# Patient Record
Sex: FEMALE | Race: WHITE | URBAN - METROPOLITAN AREA
[De-identification: names, ages, dates, MRNs, and addresses within clinical notes are randomized per-mention and may not be internally consistent; named-entity substitution may affect disease eponyms.]

---

## 2017-01-31 ASSESSMENT — ENCOUNTER SYMPTOMS
ARTHRALGIAS: 1
STIFFNESS: 1
TINGLING: 1
HEADACHES: 0
MEMORY LOSS: 0
SEIZURES: 0
DIZZINESS: 1
DISTURBANCES IN COORDINATION: 1
NUMBNESS: 1
MUSCLE CRAMPS: 0
JOINT SWELLING: 0
NECK PAIN: 0
LOSS OF CONSCIOUSNESS: 0
TREMORS: 0
BACK PAIN: 1
POOR WOUND HEALING: 1
NAIL CHANGES: 0
WEAKNESS: 1
SKIN CHANGES: 0
MUSCLE WEAKNESS: 0
PARALYSIS: 0
MYALGIAS: 1
SPEECH CHANGE: 0

## 2017-02-01 ENCOUNTER — OFFICE VISIT (OUTPATIENT)
Dept: NEUROLOGY | Facility: CLINIC | Age: 67
End: 2017-02-01

## 2017-02-01 VITALS — SYSTOLIC BLOOD PRESSURE: 127 MMHG | DIASTOLIC BLOOD PRESSURE: 76 MMHG | HEIGHT: 63 IN | HEART RATE: 96 BPM

## 2017-02-01 DIAGNOSIS — Z98.890 HISTORY OF DECOMPRESSION OF BOTH ULNAR NERVES: ICD-10-CM

## 2017-02-01 DIAGNOSIS — G60.9 HEREDITARY AND IDIOPATHIC PERIPHERAL NEUROPATHY: Primary | ICD-10-CM

## 2017-02-01 DIAGNOSIS — Z98.890 HISTORY OF CARPAL TUNNEL RELEASE OF BOTH WRISTS: ICD-10-CM

## 2017-02-01 DIAGNOSIS — M48.07 LUMBOSACRAL STENOSIS WITH NEUROGENIC CLAUDICATION: ICD-10-CM

## 2017-02-01 RX ORDER — BUPROPION HYDROCHLORIDE 150 MG/1
150 TABLET, EXTENDED RELEASE ORAL DAILY
COMMUNITY

## 2017-02-01 ASSESSMENT — PAIN SCALES - GENERAL: PAINLEVEL: NO PAIN (0)

## 2017-02-01 NOTE — Clinical Note
2/1/2017       RE: Cristiane Ramos  [address] [facility] [id number]     Dear Colleague,    Thank you for referring your patient, Cristiane Ramos, to the [facility] NEUROLOGY at [facility]. Please see a copy of my visit note below.    IMPRESSION:  1) Idiopathic sensory neuropathy   2) Lumbosacral spine stenosis causing pseudoclaudication   3) Bilateral carpal tunnel and ulnar neuropathies that were released.      SUMMARY:    1. Her deficits related to neuropathy are stable compared to her last visit.  We will not make any changes to her pain regimen. She should be meticulous about her foot hygiene, inspect her feet daily, wear comfortable shoes and continue following the instructions from Podiatry.  She understands that she is at risk for further ulcers or Charcot joints, given her severe sensory loss to small fiber modalities.  No etiology for her neuropathy has been found after exhaustive workup including nerve biopsy. At this point there is nothing we can do beyond what the Pain Clinic is already doing for management of her symptoms. She should follow with them, who should also address the chronic back pain/lumbosacral spinal stenosis issues.     PLAN:    Follow up with Pain Management  Follow up prn in Neuropathy Clinic.    ATTENDING ADDENDUM: Patient seen and examined with MS Iain Jc who was acting as scribe on my behalf. Agree with his assessment and recs as above, which I personally reviewed and edited to reflect my own impression and findings. TT spent for patient care 15 minutes; more than half was counseling. Matthew Emery MD       HPI: This is a 66 year old patient who is seen for routine follow up of idiopathic peripheral neuropathy.  The patient denies any significant changes in terms of her neuropathy and states she fees like her symptoms have not changed since she was last seen several months ago.  She does note that she had an MI and  required stenting, but notes that with rehab and PT she feels she has returned to her baseline.  He patient continues to struggle with an ulcer on the medial arch of her left foot, but she is following up with podiatry for this and has no new complaints regarding this.  She has had some electrical/burning pain in her left lateral foot when laying down.  Secondary to this she was instructed to increase her Norco from 2x daily to 3x daily with some improvement in her pain.  She follows up with Dr. Ziegler for her chronic pain.  Otherwise the patient is doing well and denies any new concerns or complaints.        History reviewed. No pertinent past medical history.      Allergies   Allergen Reactions     Codeine Nausea and Vomiting     Oxycodone      Severe nausea and vomiting      Penicillins Hives     Percocet [Oxycodone-Acetaminophen]      Severe nausea and vomiting      Propranolol           ROS: 10 point ROS neg other than the symptoms noted above in the HPI.      FAMILY HISTORY:      Social History     Social History     Marital Status:      Spouse Name: N/A     Number of Children: N/A     Years of Education: N/A     Social History Main Topics     Smoking status: Never Smoker      Smokeless tobacco: Never Used     Alcohol Use: No     Drug Use: No     Sexual Activity: Not Asked     Other Topics Concern     None     Social History Narrative           Current outpatient prescriptions:      buPROPion (WELLBUTRIN SR) 150 MG 12 hr tablet, Take 150 mg by mouth daily, Disp: , Rfl:      Aspirin (ASPIR-81 PO), Take 1 tablet by mouth daily Chewable, Disp: , Rfl:      ATORVASTATIN CALCIUM PO, Take 80 mg by mouth At Bedtime, Disp: , Rfl:      CARVEDILOL PO, Take 3.125 mg by mouth 2 times daily (with meals), Disp: , Rfl:      CLOPIDOGREL BISULFATE PO, Take 75 mg by mouth daily, Disp: , Rfl:      nitroglycerin (NITROSTAT) 0.4 MG SL tablet, Place 0.4 mg under the tongue every 5 minutes as needed for chest pain For chest  "pain place 1 tablet under the tongue every 5 minutes for 3 doses. If symptoms persist 5 minutes after 1st dose call 911., Disp: , Rfl:      rOPINIRole (REQUIP) 0.5 MG tablet, Take 0.5 mg by mouth At Bedtime, Disp: , Rfl:      allopurinol (ZYLOPRIM) 300 MG tablet, Take 300 mg by mouth daily, Disp: , Rfl:      Magnesium 300 MG CAPS, , Disp: , Rfl:      HYDROcodone-acetaminophen (NORCO) 5-325 MG per tablet, 1 tablet by Oral or Feeding Tube route every 6 hours as needed , Disp: , Rfl:      B Complex-C-Zn-Folic Acid (DIALYVITE 800/ZINC PO), Take 1 tablet by mouth daily, Disp: , Rfl:      ammonium lactate (AMLACTIN) 12 % cream, Apply topically daily as needed for dry skin, Disp: , Rfl:      Omega-3 Fatty Acids (OMEGA-3 FISH OIL PO), Take 1 g by mouth 2 times daily (with meals), Disp: , Rfl:      TRAZODONE HCL PO, Take 50 mg by mouth At Bedtime, Disp: , Rfl:      traMADol (ULTRAM) 50 MG tablet, Take 50 mg by mouth every 6 hours as needed for moderate pain , Disp: 20 tablet, Rfl: 0     lidocaine (LIDODERM) 5 % patch, 1-3 patches for 12 hours, then remove for 12 hours., Disp: 90 patch, Rfl: 11     CALCITRIOL PO, Take 0.25 mcg by mouth daily, Disp: , Rfl:      GABAPENTIN PO, Take 600 mg by mouth 300mg in AM and lunch, and take 600mg at bedtime, Disp: , Rfl:      LAMOTRIGINE PO, Take 100 mg by mouth daily , Disp: , Rfl:      LOSARTAN POTASSIUM PO, Take 50 mg by mouth daily , Disp: , Rfl:      Acetaminophen (TYLENOL PO), Take 325 mg by mouth every 8 hours as needed for mild pain or fever, Disp: , Rfl:      diclofenac (VOLTAREN) 1 % GEL, Place onto the skin daily as needed for moderate pain, Disp: , Rfl:         PHYSICAL EXAMINATION:    Vital signs:   @FLOWREFRESH([id number]::), )Blood pressure 127/76, pulse 96, height 1.6 m (5' 3\"), not currently breastfeeding.  Estimated body mass index is 23.73 kg/(m^2) as calculated from the following:    Height as of this encounter: 1.6 m (5' 3\").    Weight as of 10/28/16: 60.737 kg (133 lb " 14.4 oz).      General: Well appearing female, sitting upright using iPad.    NEURO:    Mental status:  AAOx3. Good attention span.   Normal language fluency.  CN: II-XII intact.    Motor:  5/5 strengthfor bilateral shoulder abduction, elbow flexion and extension, finger and wrist extension.  FDIs are bilaterally 4.  APBs are probably 5.  Flexor pollicis longus and long finger flexors to digits #2 and #3 are intact on both sides.  FDP to digits #4 and #5 is strong (5/5) on the right and left.  Hip flexion, knee extension are bilaterally 5.  Knee flexion is right 4+, left 4+, and foot dorsiflexion is about a 4+-5/5 bilaterally.  She cannot spread her toes.  She has an ulcer that is healing on the medial arch of the left foot and joint deformities consistent with Charcot joints.      Sensory:  No reflexes at the knees or ankles, 2 in the biceps, 1+ right triceps, trace left and 2 at brachioradialis.  Plantar responses are bilaterally flexor.  No vibration at the toes, but she has fairly preserved vibration for about 8-9 seconds at the right medial malleolus, 4 at the left, and 8-9 seconds at both kneecaps.  Vibration sensation is preserved for 15 seconds at the right index finger.  Joint position sense is impaired at the toes.  This does not seem fundamentally changed from her previous assessment.      Gait: Patient now uses walker with ambulation to help with balance issues.  She has used this since her MI in 10/2016 and denies any recent changes.    Answers for HPI/ROS submitted by the patient on 1/31/2017   General Symptoms: No  Skin Symptoms: Yes  HENT Symptoms: No  EYE SYMPTOMS: No  HEART SYMPTOMS: No  LUNG SYMPTOMS: No  INTESTINAL SYMPTOMS: No  URINARY SYMPTOMS: No  GYNECOLOGIC SYMPTOMS: No  BREAST SYMPTOMS: No  SKELETAL SYMPTOMS: Yes  BLOOD SYMPTOMS: No  NERVOUS SYSTEM SYMPTOMS: Yes  MENTAL HEALTH SYMPTOMS: No  Changes in hair: Yes  Changes in moles/birth marks: No  Itching: No  Rashes: No  Changes in nails:  No  Acne: No  Hair in places you don't want it: No  Change in facial hair: No  Warts: No  Non-healing sores: Yes  Scarring: No  Flaking of skin: No  Color changes of hands/feet in cold : No  Sun sensitivity: No  Skin thickening: No  Back pain: Yes  Muscle aches: Yes  Neck pain: No  Swollen joints: No  Joint pain: Yes  Bone pain: No  Muscle cramps: No  Muscle weakness: No  Joint stiffness: Yes  Bone fracture: No  Trouble with coordination: Yes  Dizziness or trouble with balance: Yes  Fainting or black-out spells: No  Memory loss: No  Headache: No  Seizures: No  Speech problems: No  Tingling: Yes  Tremor: No  Weakness: Yes  Difficulty walking: No  Paralysis: No  Numbness: Yes    Matthew Emery MD

## 2017-02-01 NOTE — PROGRESS NOTES
IMPRESSION:  1) Idiopathic sensory neuropathy   2) Lumbosacral spine stenosis causing pseudoclaudication   3) Bilateral carpal tunnel and ulnar neuropathies that were released.      SUMMARY:    1. Her deficits related to neuropathy are stable compared to her last visit.  We will not make any changes to her pain regimen. She should be meticulous about her foot hygiene, inspect her feet daily, wear comfortable shoes and continue following the instructions from Podiatry.  She understands that she is at risk for further ulcers or Charcot joints, given her severe sensory loss to small fiber modalities.  No etiology for her neuropathy has been found after exhaustive workup including nerve biopsy. At this point there is nothing we can do beyond what the Pain Clinic is already doing for management of her symptoms. She should follow with them, who should also address the chronic back pain/lumbosacral spinal stenosis issues.     PLAN:    Follow up with Pain Management  Follow up prn in Neuropathy Clinic.    ATTENDING ADDENDUM: Patient seen and examined with MS Iain Jc who was acting as scribe on my behalf. Agree with his assessment and recs as above, which I personally reviewed and edited to reflect my own impression and findings. TT spent for patient care 15 minutes; more than half was counseling. Matthew Emery MD       HPI: This is a 66 year old patient who is seen for routine follow up of idiopathic peripheral neuropathy.  The patient denies any significant changes in terms of her neuropathy and states she fees like her symptoms have not changed since she was last seen several months ago.  She does note that she had an MI and required stenting, but notes that with rehab and PT she feels she has returned to her baseline.  He patient continues to struggle with an ulcer on the medial arch of her left foot, but she is following up with podiatry for this and has no new complaints regarding this.  She has had some  electrical/burning pain in her left lateral foot when laying down.  Secondary to this she was instructed to increase her Norco from 2x daily to 3x daily with some improvement in her pain.  She follows up with Dr. Ziegler for her chronic pain.  Otherwise the patient is doing well and denies any new concerns or complaints.        History reviewed. No pertinent past medical history.      Allergies   Allergen Reactions     Codeine Nausea and Vomiting     Oxycodone      Severe nausea and vomiting      Penicillins Hives     Percocet [Oxycodone-Acetaminophen]      Severe nausea and vomiting      Propranolol           ROS: 10 point ROS neg other than the symptoms noted above in the HPI.      FAMILY HISTORY:      Social History     Social History     Marital Status:      Spouse Name: N/A     Number of Children: N/A     Years of Education: N/A     Social History Main Topics     Smoking status: Never Smoker      Smokeless tobacco: Never Used     Alcohol Use: No     Drug Use: No     Sexual Activity: Not Asked     Other Topics Concern     None     Social History Narrative           Current outpatient prescriptions:      buPROPion (WELLBUTRIN SR) 150 MG 12 hr tablet, Take 150 mg by mouth daily, Disp: , Rfl:      Aspirin (ASPIR-81 PO), Take 1 tablet by mouth daily Chewable, Disp: , Rfl:      ATORVASTATIN CALCIUM PO, Take 80 mg by mouth At Bedtime, Disp: , Rfl:      CARVEDILOL PO, Take 3.125 mg by mouth 2 times daily (with meals), Disp: , Rfl:      CLOPIDOGREL BISULFATE PO, Take 75 mg by mouth daily, Disp: , Rfl:      nitroglycerin (NITROSTAT) 0.4 MG SL tablet, Place 0.4 mg under the tongue every 5 minutes as needed for chest pain For chest pain place 1 tablet under the tongue every 5 minutes for 3 doses. If symptoms persist 5 minutes after 1st dose call 911., Disp: , Rfl:      rOPINIRole (REQUIP) 0.5 MG tablet, Take 0.5 mg by mouth At Bedtime, Disp: , Rfl:      allopurinol (ZYLOPRIM) 300 MG tablet, Take 300 mg by mouth  "daily, Disp: , Rfl:      Magnesium 300 MG CAPS, , Disp: , Rfl:      HYDROcodone-acetaminophen (NORCO) 5-325 MG per tablet, 1 tablet by Oral or Feeding Tube route every 6 hours as needed , Disp: , Rfl:      B Complex-C-Zn-Folic Acid (DIALYVITE 800/ZINC PO), Take 1 tablet by mouth daily, Disp: , Rfl:      ammonium lactate (AMLACTIN) 12 % cream, Apply topically daily as needed for dry skin, Disp: , Rfl:      Omega-3 Fatty Acids (OMEGA-3 FISH OIL PO), Take 1 g by mouth 2 times daily (with meals), Disp: , Rfl:      TRAZODONE HCL PO, Take 50 mg by mouth At Bedtime, Disp: , Rfl:      traMADol (ULTRAM) 50 MG tablet, Take 50 mg by mouth every 6 hours as needed for moderate pain , Disp: 20 tablet, Rfl: 0     lidocaine (LIDODERM) 5 % patch, 1-3 patches for 12 hours, then remove for 12 hours., Disp: 90 patch, Rfl: 11     CALCITRIOL PO, Take 0.25 mcg by mouth daily, Disp: , Rfl:      GABAPENTIN PO, Take 600 mg by mouth 300mg in AM and lunch, and take 600mg at bedtime, Disp: , Rfl:      LAMOTRIGINE PO, Take 100 mg by mouth daily , Disp: , Rfl:      LOSARTAN POTASSIUM PO, Take 50 mg by mouth daily , Disp: , Rfl:      Acetaminophen (TYLENOL PO), Take 325 mg by mouth every 8 hours as needed for mild pain or fever, Disp: , Rfl:      diclofenac (VOLTAREN) 1 % GEL, Place onto the skin daily as needed for moderate pain, Disp: , Rfl:         PHYSICAL EXAMINATION:    Vital signs:   @FLOWREFRESH([id number]::), )Blood pressure 127/76, pulse 96, height 1.6 m (5' 3\"), not currently breastfeeding.  Estimated body mass index is 23.73 kg/(m^2) as calculated from the following:    Height as of this encounter: 1.6 m (5' 3\").    Weight as of 10/28/16: 60.737 kg (133 lb 14.4 oz).      General: Well appearing female, sitting upright using iPad.    NEURO:    Mental status:  AAOx3. Good attention span.   Normal language fluency.  CN: II-XII intact.    Motor:  5/5 strengthfor bilateral shoulder abduction, elbow flexion and extension, finger and wrist " extension.  FDIs are bilaterally 4.  APBs are probably 5.  Flexor pollicis longus and long finger flexors to digits #2 and #3 are intact on both sides.  FDP to digits #4 and #5 is strong (5/5) on the right and left.  Hip flexion, knee extension are bilaterally 5.  Knee flexion is right 4+, left 4+, and foot dorsiflexion is about a 4+-5/5 bilaterally.  She cannot spread her toes.  She has an ulcer that is healing on the medial arch of the left foot and joint deformities consistent with Charcot joints.      Sensory:  No reflexes at the knees or ankles, 2 in the biceps, 1+ right triceps, trace left and 2 at brachioradialis.  Plantar responses are bilaterally flexor.  No vibration at the toes, but she has fairly preserved vibration for about 8-9 seconds at the right medial malleolus, 4 at the left, and 8-9 seconds at both kneecaps.  Vibration sensation is preserved for 15 seconds at the right index finger.  Joint position sense is impaired at the toes.  This does not seem fundamentally changed from her previous assessment.      Gait: Patient now uses walker with ambulation to help with balance issues.  She has used this since her MI in 10/2016 and denies any recent changes.    Answers for HPI/ROS submitted by the patient on 1/31/2017   General Symptoms: No  Skin Symptoms: Yes  HENT Symptoms: No  EYE SYMPTOMS: No  HEART SYMPTOMS: No  LUNG SYMPTOMS: No  INTESTINAL SYMPTOMS: No  URINARY SYMPTOMS: No  GYNECOLOGIC SYMPTOMS: No  BREAST SYMPTOMS: No  SKELETAL SYMPTOMS: Yes  BLOOD SYMPTOMS: No  NERVOUS SYSTEM SYMPTOMS: Yes  MENTAL HEALTH SYMPTOMS: No  Changes in hair: Yes  Changes in moles/birth marks: No  Itching: No  Rashes: No  Changes in nails: No  Acne: No  Hair in places you don't want it: No  Change in facial hair: No  Warts: No  Non-healing sores: Yes  Scarring: No  Flaking of skin: No  Color changes of hands/feet in cold : No  Sun sensitivity: No  Skin thickening: No  Back pain: Yes  Muscle aches: Yes  Neck pain:  No  Swollen joints: No  Joint pain: Yes  Bone pain: No  Muscle cramps: No  Muscle weakness: No  Joint stiffness: Yes  Bone fracture: No  Trouble with coordination: Yes  Dizziness or trouble with balance: Yes  Fainting or black-out spells: No  Memory loss: No  Headache: No  Seizures: No  Speech problems: No  Tingling: Yes  Tremor: No  Weakness: Yes  Difficulty walking: No  Paralysis: No  Numbness: Yes

## 2017-04-17 ENCOUNTER — OFFICE VISIT (OUTPATIENT)
Dept: ANESTHESIOLOGY | Facility: CLINIC | Age: 67
End: 2017-04-17

## 2017-04-17 VITALS
WEIGHT: 135.1 LBS | BODY MASS INDEX: 23.93 KG/M2 | HEART RATE: 83 BPM | OXYGEN SATURATION: 100 % | SYSTOLIC BLOOD PRESSURE: 114 MMHG | DIASTOLIC BLOOD PRESSURE: 71 MMHG

## 2017-04-17 DIAGNOSIS — M47.816 SPONDYLOSIS OF LUMBAR REGION WITHOUT MYELOPATHY OR RADICULOPATHY: ICD-10-CM

## 2017-04-17 DIAGNOSIS — G60.9 HEREDITARY AND IDIOPATHIC PERIPHERAL NEUROPATHY: Primary | ICD-10-CM

## 2017-04-17 DIAGNOSIS — M53.3 SACROILIAC JOINT PAIN: ICD-10-CM

## 2017-04-17 DIAGNOSIS — M47.817 LUMBOSACRAL SPONDYLOSIS WITHOUT MYELOPATHY: ICD-10-CM

## 2017-04-17 RX ORDER — TOPIRAMATE 25 MG/1
25 TABLET, FILM COATED ORAL 2 TIMES DAILY
Qty: 60 TABLET | Refills: 3 | Status: SHIPPED | OUTPATIENT
Start: 2017-04-17

## 2017-04-17 NOTE — PATIENT INSTRUCTIONS
1. Start taking topamax as follows:    1 tab= 25mg  AM   PM  0   25mg (1 tab).  If tolerating, after 1 week go to the next line.  25mg (1 tab)  25mg (1 tab).  Goal    -This medicine may make you dizzy, drowsy, or tired. Do not drive or do anything else that could be dangerous until you know how this medicine affects you.  -Remain well hydrated, due to risk of kidney stones  -Do not stop using this medicine suddenly. Your doctor will need to slowly decrease your dose before you stop it completely.    2. Restart taking alpha lipoic acid      Follow up:    With Dr. Gordon at the  as needed or before June 2nd    To speak with a nurse, schedule/reschedule/cancel a clinic appointment, or request a medication refill call: [phone]     You can also reach us by [employer]: [email or web address]    For refills, please call on Monday, 1 week before your medication runs out. The doctors are not always in clinic, so this gives us time to get your prescriptions ready.  Please let us know the name of the medication you are requesting a refill of.

## 2017-04-17 NOTE — MR AVS SNAPSHOT
After Visit Summary   4/17/2017    Cristiane Ramos    MRN: [id number]           Patient Information     Date Of Birth          1950        Visit Information        Provider Department      4/17/2017 10:30 AM Victor Manuel Gordon MD [facility]        Today's Diagnoses     Hereditary and idiopathic peripheral neuropathy    -  1    Lumbosacral spondylosis without myelopathy        Spondylosis of lumbar region without myelopathy or radiculopathy        Sacroiliac joint pain          Care Instructions    1. Start taking topamax as follows:    1 tab= 25mg  AM   PM  0   25mg (1 tab).  If tolerating, after 1 week go to the next line.  25mg (1 tab)  25mg (1 tab).  Goal    -This medicine may make you dizzy, drowsy, or tired. Do not drive or do anything else that could be dangerous until you know how this medicine affects you.  -Remain well hydrated, due to risk of kidney stones  -Do not stop using this medicine suddenly. Your doctor will need to slowly decrease your dose before you stop it completely.    2. Restart taking alpha lipoic acid      Follow up:    With Dr. Gordon at the  as needed or before June 2nd    To speak with a nurse, schedule/reschedule/cancel a clinic appointment, or request a medication refill call: [phone]     You can also reach us by [employer]: [email or web address]    For refills, please call on Monday, 1 week before your medication runs out. The doctors are not always in clinic, so this gives us time to get your prescriptions ready.  Please let us know the name of the medication you are requesting a refill of.                                   Follow-ups after your visit        Who to contact     Please call your clinic at [phone] to:    Ask questions about your health    Make or cancel appointments    Discuss your medicines    Learn about your test results    Speak to your doctor   If you have compliments or  concerns about an experience at your clinic, or if you wish to file a complaint, please contact [facility] Patient Relations at [phone] or email us at [email or web address]         Additional Information About Your Visit        [employer] Information     [employer] gives you secure access to your electronic health record. If you see a primary care provider, you can also send messages to your care team and make appointments. If you have questions, please call your primary care clinic.  If you do not have a primary care provider, please call [phone] and they will assist you.      [employer] is an electronic gateway that provides easy, online access to your medical records. With [employer], you can request a clinic appointment, read your test results, renew a prescription or communicate with your care team.     To access your existing account, please contact your [facility] Clinic or call [phone] for assistance.        Care EveryWhere ID     This is your Care EveryWhere ID. This could be used by other organizations to access your [facility] medical records  [id number]        Your Vitals Were     Pulse Pulse Oximetry BMI (Body Mass Index)             83 100% 23.93 kg/m2          Blood Pressure from Last 3 Encounters:   04/17/17 114/71   02/01/17 127/76   10/28/16 129/70    Weight from Last 3 Encounters:   04/17/17 61.3 kg (135 lb 1.6 oz)   10/28/16 60.7 kg (133 lb 14.4 oz)   08/31/16 60.3 kg (133 lb)              Today, you had the following     No orders found for display         Today's Medication Changes          These changes are accurate as of: 4/17/17 10:35 AM.  If you have any questions, ask your nurse or doctor.               Start taking these medicines.        Dose/Directions    topiramate 25 MG tablet   Commonly known as:  TOPAMAX   Used for:  Hereditary and idiopathic peripheral neuropathy   Started by:  Victor Manuel Gordon MD        Dose:  25 mg    Take 1 tablet (25 mg) by mouth 2 times daily Take as directed in clinic   Quantity:  60 tablet   Refills:  3            Where to get your medicines      These medications were sent to [id number] - [place], [place] - [address] AT [facility] & [facility] [address]  [address], [place] [address]     Phone:  [phone]     topiramate 25 MG tablet                Primary Care Provider Office Phone # Fax #    Gómez Walters [phone]       [facility] [address] [facility] [address]        Thank you!     Thank you for choosing [facility] [[email or web address]] FOR COMPREHENSIVE PAIN MANAGEMENT  for your care. Our goal is always to provide you with excellent care. Hearing back from our patients is one way we can continue to improve our services. Please take a few minutes to complete the written survey that you may receive in the mail after your visit with us. Thank you!             Your Updated Medication List - Protect others around you: Learn how to safely use, store and throw away your medicines at www.disposemymeds.org.          This list is accurate as of: 4/17/17 10:35 AM.  Always use your most recent med list.                   Brand Name Dispense Instructions for use    allopurinol 300 MG tablet    ZYLOPRIM     Take 300 mg by mouth daily       ammonium lactate 12 % cream    AMLACTIN     Apply topically daily as needed for dry skin       ATORVASTATIN CALCIUM PO      Take 80 mg by mouth At Bedtime       buPROPion 150 MG 12 hr tablet    WELLBUTRIN SR     Take 150 mg by mouth daily       CALCITRIOL PO      Take 0.25 mcg by mouth daily       CARVEDILOL PO      Take 6.25 mg by mouth 2 times daily (with meals)       CLOPIDOGREL BISULFATE PO      Take 75 mg by mouth daily       DIALYVITE 800/ZINC PO      Take 1 tablet by mouth daily       diclofenac 1 % Gel topical gel    VOLTAREN     Place onto the skin daily as needed for moderate pain       GABAPENTIN PO      Take  600 mg by mouth 300mg in AM and lunch, and take 600mg at bedtime       HYDROcodone-acetaminophen 5-325 MG per tablet    NORCO     1 tablet by Oral or Feeding Tube route every 6 hours as needed       LAMOTRIGINE PO      Take 100 mg by mouth daily       lidocaine 5 % Patch    LIDODERM    90 patch    1-3 patches for 12 hours, then remove for 12 hours.       LOSARTAN POTASSIUM PO      Take 50 mg by mouth daily       Magnesium 300 MG Caps          nitroglycerin 0.4 MG sublingual tablet    NITROSTAT     Place 0.4 mg under the tongue every 5 minutes as needed for chest pain For chest pain place 1 tablet under the tongue every 5 minutes for 3 doses. If symptoms persist 5 minutes after 1st dose call 911.       OMEGA-3 FISH OIL PO      Take 1 g by mouth 2 times daily (with meals)       REQUIP 0.5 MG tablet   Generic drug:  rOPINIRole      Take 0.5 mg by mouth At Bedtime       topiramate 25 MG tablet    TOPAMAX    60 tablet    Take 1 tablet (25 mg) by mouth 2 times daily Take as directed in clinic       traMADol 50 MG tablet    ULTRAM    20 tablet    Take 50 mg by mouth every 6 hours as needed for moderate pain       TRAZODONE HCL PO      Take 50 mg by mouth At Bedtime       TYLENOL PO      Take 325 mg by mouth every 8 hours as needed for mild pain or fever

## 2017-04-17 NOTE — LETTER
4/17/2017       RE: Cristiane Ramos  [address] [facility] [address] [facility] [address]     Dear Colleague,    Thank you for referring your patient, Cristiane Ramos, to the [facility] FOR [facility]ENSIVE PAIN MANAGEMENT at [facility]. Please see a copy of my visit note below.    Pain Clinic Follow-up Visit  4/17/2017    Interim history:    Has been doing fairly well from cardiac standpoint. Now doing maintenance cardiac rehabilitation . Has only used nitro about once per month. Still planning to be on plavix for a full year from her myocardial infarction (on 10/4/16). Is hoping to come off of coumadin soon, and follow-up with cardiology is soon.     Patient still with considerable low back pain. This is relatively unchanged from previous. Standing and walking is limited to 15 minutes or less. Uses a walker when walking a dog. Has been wearing back brace which helps. Medial branch blocks helped a lot with this pain for a transient time period.     Bilateral knee pain as well. Has been seen at [facility] for this issue.     Also peripheral neuropathy with significant pain.     gabapentin 300-300-600, with benefit but kidney disease limits higher dosing. Tramadol 100mg usually BID with benefit. lidoderm patches on the feet. Vicodin use is average of about 1 every day since the myocardial infarction. She is worried about this daily use and would like to use less. Alpha lipoic acid was somewhat benefit for the neuropathic pain. She ran out of it but plans to get more.     No history of renal stones.       Original Subjective:    65 year old female with past medical history of idiopathic peripheral neuropathy, stage IV kidney failure, right charcot foot, presents for evaluation of back pain and bilateral lower extremities pain. The neuropathy affects bilateral upper extremities and lower extremities in mostly a stocking/glove pattern. She has had surgeries on both carpal tunnels  and to release both ulnar nerves as well. Patient with the significant low back pain since 2012, less bothersome for many years before that. No inciting event or trauma. Bilateral lower extremities numbness started about in 2008. Ambulation is now limited to about a half a block. Patient states that this walking is limited by pain in the back getting progressively worse, and not by lower extremities pain or weakness. Pain is the worst in the back.   The back pain is achy, sharp with movement. The pain is fairly constant. Pain can be severe at times, but waxes and wanes in severity. Pain on average is about 5/10. Pain is better with rest, medications. Worse with prolonged activity. The patient endorses left lower extremity weakness, which reportedly does not affect walking, but she states is still there. Feet essentially insensate up to mid shin. Hands with altered/ diminished sensation throughout. No incontinence of bowel or bladder.  Taking aleve, with benefit. gabapentin 300-300-600, with benefit but kidney disease limits higher dosing. Tramadol 100mg BID with benefit. lidoderm patches on the feet and the low back. Vicodin use is about 1 every other day, maybe 17 tabs per month.   physical therapy has not been done for her back in about 3 years. She does do home exercise/stretching program, but not on a regular basis.   Acupuncture was no help. Transforaminal epidural steroid injection on either L4 or L5 on the left per Dr Kidd at [facility] gave transient relief but no lasting effect. Dr Kidd has reportedly recommended a multilevel fusion for treatment of her back pain.    ?  past medical history  Idiopathic neuropathy  Low back pain   past medical history reviewed with patient.   Stage IV kidney disease     past surgical history:  surgeries on both carpal tunnels and to release both ulnar nerves as well.    past surgical history reviewed with patient.     Medications:  Current Outpatient Prescriptions    Medication     buPROPion (WELLBUTRIN SR) 150 MG 12 hr tablet     Aspirin (ASPIR-81 PO)     ATORVASTATIN CALCIUM PO     CARVEDILOL PO     CLOPIDOGREL BISULFATE PO     nitroglycerin (NITROSTAT) 0.4 MG SL tablet     rOPINIRole (REQUIP) 0.5 MG tablet     allopurinol (ZYLOPRIM) 300 MG tablet     Magnesium 300 MG CAPS     HYDROcodone-acetaminophen (NORCO) 5-325 MG per tablet     B Complex-C-Zn-Folic Acid (DIALYVITE 800/ZINC PO)     ammonium lactate (AMLACTIN) 12 % cream     Omega-3 Fatty Acids (OMEGA-3 FISH OIL PO)     TRAZODONE HCL PO     traMADol (ULTRAM) 50 MG tablet     lidocaine (LIDODERM) 5 % patch     CALCITRIOL PO     GABAPENTIN PO     LAMOTRIGINE PO     LOSARTAN POTASSIUM PO     Acetaminophen (TYLENOL PO)     diclofenac (VOLTAREN) 1 % GEL     No current facility-administered medications for this visit.      [place] and [place] Prescription Monitoring Program reviewed    Allergies:     Allergies   Allergen Reactions     Codeine Nausea and Vomiting     Oxycodone      Severe nausea and vomiting      Penicillins Hives     Percocet [Oxycodone-Acetaminophen]      Severe nausea and vomiting      Propranolol      Family History:  family history is not on file.  Social history: She lives in [facility]. 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 No street drugs.     Review Of Systems  Skin: ulcers on feet  Eyes: glasses  Ears/Nose/Throat: negative  Respiratory: No shortness of breath, dyspnea on exertion, cough, or hemoptysis  Cardiovascular: negative  Gastrointestinal: negative  Genitourinary: as above, kidney failure  Musculoskeletal: as above and back pain  Neurologic: as above, numbness or tingling of hands and numbness or tingling of feet  Psychiatric: negative  Hematologic/Lymphatic/Immunologic: negative  Endocrine: negative    Objective:   /71  Pulse 83  Wt 61.3 kg (135 lb 1.6 oz)  SpO2 100%  BMI 23.93 kg/m2  Body mass index is 23.93 kg/(m^2).  General: In no apparent  distress  Mental status: Normal affect, pleasant  Head: Atraumatic, normocephalic  Eyes: Extra-ocular movements intact, no scleral icterus  Cardiovascular: Regular rate,   Respiratory: No respiratory distress  Abdomen: soft, non-distended  Msk: Pain with extension and rotation of lumbar spine. Tender to palpation lower lumbar paraspinals.  Neuro: AAOx3.   CN II-XII are grossly intact.   At least antigravity strength noted in all 4 extremities  Skin: No rashes or lesions noted on exposed areas of skin  Lymph: no supraclavicular lymphadenopathy    Imaging: MRI 8/6/14 from [facility] Rad[facility] results scanned into the system, summarized as moderately severe central stenosis L3/4 and severe facet arthropathy at L3/4, L4/5 and L5/S1.    DIRE Score for ongoing opioid management is calculated as follows:    Diagnosis = 2    Intractability = 2    Risk: Psych = 2  Chem Hlth = 3  Reliability = 3  Social = 3    Efficacy = 3    Total DIRE Score = 18 (14 or higher predicts good candidate for ongoing opioid management; 13 or lower predicts poor candidate for opioid management)         Assessment:  1. Lumbar stenosis with neurogenic claudication: patient does not think that the lower extremity symptoms are what limits her ability to ambulate significant distance, but this is somewhat clouded by the severity of her peripheral neuropathy.   2. Lumbar spondylosis: this still appears be the major source of her pain and dysfunction  3. Idiopathic polyneuropathy: this is severe and clouds the above diagnosis   4. Sacroiliac joint pain: on exam this appears to be a secondary pain generator. This will be better delineated through diagnostic injections.     Barriers:  1. none    Plan:   1. Patient education: I went over the above diagnoses and treatment plan with her and answered all of her questions.  2. Exercise program: continue cardiac rehab  3. Medications: Add low dose topamax considering renal function. restart alpha lipoic  acid  4. Interventions: lumbar radiofrequency ablation on hold due to the need for plavix, coumadin  5. Referrals: none  6. Follow up: prn      Total face to face time spent was 20 minutes, and more than 50% of face to face time was spent in counseling and/or coordination of care regarding principles of multidisciplinary care, medication management, and interventional management.     Victor Manuel Gordon MD  Pain Medicine  Physical Medicine and Rehabilitation  [facility] Department of Anesthesia

## 2017-04-17 NOTE — PROGRESS NOTES
Pain Clinic Follow-up Visit  4/17/2017    Interim history:    Has been doing fairly well from cardiac standpoint. Now doing maintenance cardiac rehabilitation . Has only used nitro about once per month. Still planning to be on plavix for a full year from her myocardial infarction (on 10/4/16). Is hoping to come off of coumadin soon, and follow-up with cardiology is soon.     Patient still with considerable low back pain. This is relatively unchanged from previous. Standing and walking is limited to 15 minutes or less. Uses a walker when walking a dog. Has been wearing back brace which helps. Medial branch blocks helped a lot with this pain for a transient time period.     Bilateral knee pain as well. Has been seen at [facility] for this issue.     Also peripheral neuropathy with significant pain.     gabapentin 300-300-600, with benefit but kidney disease limits higher dosing. Tramadol 100mg usually BID with benefit. lidoderm patches on the feet. Vicodin use is average of about 1 every day since the myocardial infarction. She is worried about this daily use and would like to use less. Alpha lipoic acid was somewhat benefit for the neuropathic pain. She ran out of it but plans to get more.     No history of renal stones.       Original Subjective:    65 year old female with past medical history of idiopathic peripheral neuropathy, stage IV kidney failure, right charcot foot, presents for evaluation of back pain and bilateral lower extremities pain. The neuropathy affects bilateral upper extremities and lower extremities in mostly a stocking/glove pattern. She has had surgeries on both carpal tunnels and to release both ulnar nerves as well. Patient with the significant low back pain since 2012, less bothersome for many years before that. No inciting event or trauma. Bilateral lower extremities numbness started about in 2008. Ambulation is now limited to about a half a block. Patient states that this walking is  limited by pain in the back getting progressively worse, and not by lower extremities pain or weakness. Pain is the worst in the back.   The back pain is achy, sharp with movement. The pain is fairly constant. Pain can be severe at times, but waxes and wanes in severity. Pain on average is about 5/10. Pain is better with rest, medications. Worse with prolonged activity. The patient endorses left lower extremity weakness, which reportedly does not affect walking, but she states is still there. Feet essentially insensate up to mid shin. Hands with altered/ diminished sensation throughout. No incontinence of bowel or bladder.  Taking aleve, with benefit. gabapentin 300-300-600, with benefit but kidney disease limits higher dosing. Tramadol 100mg BID with benefit. lidoderm patches on the feet and the low back. Vicodin use is about 1 every other day, maybe 17 tabs per month.   physical therapy has not been done for her back in about 3 years. She does do home exercise/stretching program, but not on a regular basis.   Acupuncture was no help. Transforaminal epidural steroid injection on either L4 or L5 on the left per Dr Kidd at [facility] gave transient relief but no lasting effect. Dr Kidd has reportedly recommended a multilevel fusion for treatment of her back pain.    ?  past medical history  Idiopathic neuropathy  Low back pain   past medical history reviewed with patient.   Stage IV kidney disease     past surgical history:  surgeries on both carpal tunnels and to release both ulnar nerves as well.    past surgical history reviewed with patient.     Medications:  Current Outpatient Prescriptions   Medication     buPROPion (WELLBUTRIN SR) 150 MG 12 hr tablet     Aspirin (ASPIR-81 PO)     ATORVASTATIN CALCIUM PO     CARVEDILOL PO     CLOPIDOGREL BISULFATE PO     nitroglycerin (NITROSTAT) 0.4 MG SL tablet     rOPINIRole (REQUIP) 0.5 MG tablet     allopurinol (ZYLOPRIM) 300 MG tablet     Magnesium 300 MG CAPS      HYDROcodone-acetaminophen (NORCO) 5-325 MG per tablet     B Complex-C-Zn-Folic Acid (DIALYVITE 800/ZINC PO)     ammonium lactate (AMLACTIN) 12 % cream     Omega-3 Fatty Acids (OMEGA-3 FISH OIL PO)     TRAZODONE HCL PO     traMADol (ULTRAM) 50 MG tablet     lidocaine (LIDODERM) 5 % patch     CALCITRIOL PO     GABAPENTIN PO     LAMOTRIGINE PO     LOSARTAN POTASSIUM PO     Acetaminophen (TYLENOL PO)     diclofenac (VOLTAREN) 1 % GEL     No current facility-administered medications for this visit.      [place] Pres[place] Monitoring [place] reviewed    Allergies:     Allergies   Allergen Reactions     Codeine Nausea and Vomiting     Oxycodone      Severe nausea and vomiting      Penicillins Hives     Percocet [Oxycodone-Acetaminophen]      Severe nausea and vomiting      Propranolol      Family History:  family history is not on file.  Social history: She lives in [facility]. 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 No street drugs.     Review Of Systems  Skin: ulcers on feet  Eyes: glasses  Ears/Nose/Throat: negative  Respiratory: No shortness of breath, dyspnea on exertion, cough, or hemoptysis  Cardiovascular: negative  Gastrointestinal: negative  Genitourinary: as above, kidney failure  Musculoskeletal: as above and back pain  Neurologic: as above, numbness or tingling of hands and numbness or tingling of feet  Psychiatric: negative  Hematologic/Lymphatic/Immunologic: negative  Endocrine: negative    Objective:   /71  Pulse 83  Wt 61.3 kg (135 lb 1.6 oz)  SpO2 100%  BMI 23.93 kg/m2  Body mass index is 23.93 kg/(m^2).  General: In no apparent distress  Mental status: Normal affect, pleasant  Head: Atraumatic, normocephalic  Eyes: Extra-ocular movements intact, no scleral icterus  Cardiovascular: Regular rate,   Respiratory: No respiratory distress  Abdomen: soft, non-distended  Msk: Pain with extension and rotation of lumbar spine. Tender to palpation lower  lumbar paraspinals.  Neuro: AAOx3.   CN II-XII are grossly intact.   At least antigravity strength noted in all 4 extremities  Skin: No rashes or lesions noted on exposed areas of skin  Lymph: no supraclavicular lymphadenopathy    Imaging: MRI 8/6/14 from [facility] Radiology results scanned into the system, summarized as moderately severe central stenosis L3/4 and severe facet arthropathy at L3/4, L4/5 and L5/S1.    DIRE Score for ongoing opioid management is calculated as follows:    Diagnosis = 2    Intractability = 2    Risk: Psych = 2  Chem Hlth = 3  Reliability = 3  Social = 3    Efficacy = 3    Total DIRE Score = 18 (14 or higher predicts good candidate for ongoing opioid management; 13 or lower predicts poor candidate for opioid management)         Assessment:  1. Lumbar stenosis with neurogenic claudication: patient does not think that the lower extremity symptoms are what limits her ability to ambulate significant distance, but this is somewhat clouded by the severity of her peripheral neuropathy.   2. Lumbar spondylosis: this still appears be the major source of her pain and dysfunction  3. Idiopathic polyneuropathy: this is severe and clouds the above diagnosis   4. Sacroiliac joint pain: on exam this appears to be a secondary pain generator. This will be better delineated through diagnostic injections.     Barriers:  1. none    Plan:   1. Patient education: I went over the above diagnoses and treatment plan with her and answered all of her questions.  2. Exercise program: continue cardiac rehab  3. Medications: Add low dose topamax considering renal function. restart alpha lipoic acid  4. Interventions: lumbar radiofrequency ablation on hold due to the need for plavix, coumadin  5. Referrals: none  6. Follow up: prn      Total face to face time spent was 20 minutes, and more than 50% of face to face time was spent in counseling and/or coordination of care regarding principles of multidisciplinary care,  medication management, and interventional management.     Victor Manuel Gordon MD  Pain Medicine  Physical Medicine and Rehabilitation  [facility] Department of Anesthesia

## 2017-08-16 ENCOUNTER — TELEPHONE (OUTPATIENT)
Dept: ANESTHESIOLOGY | Facility: CLINIC | Age: 67
End: 2017-08-16

## 2017-08-16 NOTE — TELEPHONE ENCOUNTER
LPN called pt to follow up with them regarding a fax that clinic received for a refill of their Topamax- 25 mg tabs, BID.   Pt was last seen in clinic on 4/17/17 with Dr. Gordon. Pt was informed at that appointment that Dr. Gordon would be leaving the clinic on 6/2/17 and was told to follow up with him either before then or with another Provider.     VM left for pt informing her that it is not safe to refill the medication with out speaking with the patient, Pt was asked to call the clinic back to schedule to reestablish care with another [facility] MD. (Depending on when patient calls back, Dr. Mae had some opening at the end of August).      Claudette Pires LPN

## 2019-11-08 ENCOUNTER — HEALTH MAINTENANCE LETTER (OUTPATIENT)
Age: 69
End: 2019-11-08

## 2020-02-23 ENCOUNTER — HEALTH MAINTENANCE LETTER (OUTPATIENT)
Age: 70
End: 2020-02-23

## 2020-12-06 ENCOUNTER — HEALTH MAINTENANCE LETTER (OUTPATIENT)
Age: 70
End: 2020-12-06

## 2021-04-11 ENCOUNTER — HEALTH MAINTENANCE LETTER (OUTPATIENT)
Age: 71
End: 2021-04-11

## 2021-09-25 ENCOUNTER — HEALTH MAINTENANCE LETTER (OUTPATIENT)
Age: 71
End: 2021-09-25

## 2021-11-20 ENCOUNTER — HEALTH MAINTENANCE LETTER (OUTPATIENT)
Age: 71
End: 2021-11-20

## 2022-05-07 ENCOUNTER — HEALTH MAINTENANCE LETTER (OUTPATIENT)
Age: 72
End: 2022-05-07

## 2023-03-24 ENCOUNTER — TRANSCRIBE ORDERS (OUTPATIENT)
Dept: OTHER | Age: 73
End: 2023-03-24

## 2023-03-24 DIAGNOSIS — G60.9 HEREDITARY AND IDIOPATHIC PERIPHERAL NEUROPATHY: Primary | ICD-10-CM

## 2023-12-02 ENCOUNTER — HEALTH MAINTENANCE LETTER (OUTPATIENT)
Age: 73
End: 2023-12-02